# Patient Record
Sex: FEMALE
[De-identification: names, ages, dates, MRNs, and addresses within clinical notes are randomized per-mention and may not be internally consistent; named-entity substitution may affect disease eponyms.]

---

## 2022-01-31 ENCOUNTER — FORM ENCOUNTER (OUTPATIENT)
Age: 27
End: 2022-01-31

## 2022-02-02 PROBLEM — Z00.00 ENCOUNTER FOR PREVENTIVE HEALTH EXAMINATION: Status: ACTIVE | Noted: 2022-02-02

## 2022-02-03 ENCOUNTER — APPOINTMENT (OUTPATIENT)
Dept: ORTHOPEDIC SURGERY | Facility: CLINIC | Age: 27
End: 2022-02-03
Payer: COMMERCIAL

## 2022-02-03 PROCEDURE — G2012 BRIEF CHECK IN BY MD/QHP: CPT

## 2022-02-07 NOTE — REASON FOR VISIT
[Home] : at home, [unfilled] , at the time of the visit. [Medical Office: ([facility])___] : at the medical office located in  [Verbal consent obtained from patient] : the patient, [unfilled] [FreeTextEntry1] : Left arm mass

## 2022-02-07 NOTE — HISTORY OF PRESENT ILLNESS
[Stable] : stable [1] : currently ~his/her~ pain is 1 out of 10 [FreeTextEntry1] : \par This is a 26-year-old female who has been bothered by a left arm mass.  Is been going on for a few months.  She saw a dermatologist who sent her for imaging however it is under the skin and so was sent for further evaluation.  She occasionally has some pain with it.  She is groin.  She does not problems.  She has no other lesions like this.  She does have a significant family history of vascular lesions in siblings.

## 2022-02-07 NOTE — DISCUSSION/SUMMARY
[All Questions Answered] : Patient (and family) had all questions answered to an agreeable level of satisfaction [Interested in Proceeding] : Patient (and family) expressed understanding and interest in proceeding with the plan as outlined [de-identified] : Patient is an ill-defined lesion in the fat above the muscle.  This is 2 cm and may be vascular in nature.  My recommendation is to get an MRI scan with and without contrast of the mass.  This will help see if this is hemangioma/AVM/other vascular lesion.  She may need biopsy or other excision as necessary.  Follow-up after MRI scan.\par \par If imaging was ordered, the patient was told to make an appointment to review findings right after all imaging is completed.\par \par We discussed risks, benefits and alternatives. Rationale of care was reviewed and all questions were answered. Patient (and family) had all questions answered to her degree of the level of satisfaction. Patient (and family) expressed understanding and interest in proceeding with the plan as outlined.\par \par \par \par \par This note was done with a voice recognition transcription software and any typos are related to this rather than medical error. Surgical risks reviewed. Patient (and family) had all questions answered to an agreeable level of satisfaction. Patient (and family) expressed understanding and interest in proceeding with the plan as outlined.  \par

## 2022-02-07 NOTE — PHYSICAL EXAM
[General Appearance - Well-Appearing] : Well appearing [General Appearance - Well Nourished] : well nourished [Masses] : masses [Normal] : Palpable radial pulse, warm and pink with brisk capillary refill. [FreeTextEntry1] : On exam the patient is able to show me the 2 cm lesion on the lateral side of the left upper arm above the deltoid.  It is discolored with a bluish purplish coloration.  It is minimally tender.  Is mildly ballotable.  It is minimally mobile.  It is soft and rubbery.  There is no Tinel's, thrills or bruits.  She has no self palpable lymphadenopathy.

## 2022-02-07 NOTE — DATA REVIEWED
[Imaging Present] : Present [de-identified] : Ultrasound from February 4, 2022 shows a lobulated solid hypoechoic and heterogeneous mass in the left arm measuring 1.8 x 1.4 x 1.9 cm with internal vascularity.

## 2022-02-13 ENCOUNTER — FORM ENCOUNTER (OUTPATIENT)
Age: 27
End: 2022-02-13

## 2022-02-17 ENCOUNTER — NON-APPOINTMENT (OUTPATIENT)
Age: 27
End: 2022-02-17

## 2022-02-18 ENCOUNTER — APPOINTMENT (OUTPATIENT)
Dept: ORTHOPEDIC SURGERY | Facility: CLINIC | Age: 27
End: 2022-02-18
Payer: COMMERCIAL

## 2022-02-18 ENCOUNTER — LABORATORY RESULT (OUTPATIENT)
Age: 27
End: 2022-02-18

## 2022-02-18 DIAGNOSIS — R22.32 LOCALIZED SWELLING, MASS AND LUMP, LEFT UPPER LIMB: ICD-10-CM

## 2022-02-18 PROCEDURE — 99214 OFFICE O/P EST MOD 30 MIN: CPT | Mod: 25

## 2022-02-18 PROCEDURE — 20206Z: CUSTOM | Mod: LT

## 2022-02-18 NOTE — DISCUSSION/SUMMARY
[All Questions Answered] : Patient (and family) had all questions answered to an agreeable level of satisfaction [Interested in Proceeding] : Patient (and family) expressed understanding and interest in proceeding with the plan as outlined [de-identified] : Soft tissue mass that is indeterminate.  While it is small and superficial it is likely benign however due to new diagnosis prior to moving further.  Recommendation is for biopsy which we did in the office today.  Cultures and pathology was sent.  We will follow up once we have a final pathologic diagnosis to plan for possible treatment.  It is tender so she would like it out.  We will discuss this after pathology.\par \par If imaging was ordered, the patient was told to make an appointment to review findings right after all imaging is completed.\par \par We discussed risks, benefits and alternatives. Rationale of care was reviewed and all questions were answered. Patient (and family) had all questions answered to her degree of the level of satisfaction. Patient (and family) expressed understanding and interest in proceeding with the plan as outlined.\par \par \par \par \par This note was done with a voice recognition transcription software and any typos are related to this rather than medical error. Surgical risks reviewed. Patient (and family) had all questions answered to an agreeable level of satisfaction. Patient (and family) expressed understanding and interest in proceeding with the plan as outlined.  \par

## 2022-02-18 NOTE — PROCEDURE
[Biopsy] : Biopsy [Left] :  left [Mass ___ (size, type of)] :  [unfilled] Mass [Patient] : patient [Parent] : parent [Risk] : Risk [Benefits] : benefits [Alternatives] : alternatives [Bleeding] : bleeding [Verbal Consent Obtained] : verbal consent was obtained prior to the procedure [Alcohol] : Alcohol [Betadine] : Betadine [Ethyl Chloride Spray] : ethyl chloride spray was used as a topical anesthetic [___mL] : [unfilled] ~L [2%] : 2% lidocaine [Needle Gauge___] : Local anesthetic was administered using a [unfilled]-gauge needle [Ultrasound Guided] : The procedure was ultrasound guided.   [Direct] : direct [Trucut] : Trucut needle [Culture] : culture [Cores] : cores [Tolerated Well] : the patient tolerated the procedure well [None] : None [de-identified] : arm mass

## 2022-02-18 NOTE — DATA REVIEWED
[Imaging Present] : Present [de-identified] : I personally reviewed the following imaging studies both images and report with my own interpretation as below\par \par MRI from 2/14/2022 of the left arm show 1.6x2.0x2.1 cm subcutaneoous mass with mixed internal enhancement. This is nonspecific.  This could be sarcoma versus inflammatory versus other lesions such as DFSP.

## 2022-02-18 NOTE — HISTORY OF PRESENT ILLNESS
[FreeTextEntry1] : Patient is back to review her MRI scan as well as plan for treatment.  She has a little ecchymosis in the area but does not member hitting it.  This is been going on for 6 months and was recently imaged.  Other than that she is stable [Stable] : stable [2] : currently ~his/her~ pain is 2 out of 10

## 2022-02-18 NOTE — PHYSICAL EXAM
[FreeTextEntry1] : On exam the patient stands in good balance.  She is able to move around appropriately with full range of motion.  She has a 2 cm mass just under the skin with some bluish tint.  It is irregular and rubbery.  It is mildly tender.  There is some mild yellowish ecchymosis along with.  She is neurovascular intact.  There is no Tinel's, thrills or bruits.  There is no axillary lymphadenopathy. [General Appearance - Well-Appearing] : Well appearing [General Appearance - Well Nourished] : well nourished [Oriented To Time, Place, And Person] : Oriented to person, place, and time [Impaired Insight] : Insight and judgment were intact [Affect] : The affect was normal. [Mood] : the mood was normal [Sclera] : the sclera and conjunctiva were normal [Neck Cervical Mass (___cm)] : no neck mass was observed [Heart Rate And Rhythm] : heart rate was normal and rhythm regular [] : No respiratory distress [Abdomen Soft] : Soft [Normal Station and Gait] : gait and station were normal [Tenderness] : tenderness [Masses] : masses [Normal] : Palpable radial pulse, warm and pink with brisk capillary refill.

## 2022-03-30 ENCOUNTER — TRANSCRIPTION ENCOUNTER (OUTPATIENT)
Age: 27
End: 2022-03-30